# Patient Record
Sex: FEMALE | Race: ASIAN | NOT HISPANIC OR LATINO
[De-identification: names, ages, dates, MRNs, and addresses within clinical notes are randomized per-mention and may not be internally consistent; named-entity substitution may affect disease eponyms.]

---

## 2017-01-22 ENCOUNTER — IMAGING SERVICES (OUTPATIENT)
Dept: OTHER | Age: 52
End: 2017-01-22

## 2017-01-22 ENCOUNTER — DIAGNOSTIC TRANS (OUTPATIENT)
Dept: OTHER | Age: 52
End: 2017-01-22

## 2017-01-22 ENCOUNTER — HOSPITAL (OUTPATIENT)
Dept: OTHER | Age: 52
End: 2017-01-22
Attending: EMERGENCY MEDICINE

## 2017-01-22 LAB
ANALYZER ANC (IANC): NORMAL
ANION GAP SERPL CALC-SCNC: 16 MMOL/L (ref 10–20)
BASOPHILS # BLD: 0 THOUSAND/MCL (ref 0–0.3)
BASOPHILS NFR BLD: 0 %
BNP SERPL-MCNC: 5 PG/ML
BUN SERPL-MCNC: 14 MG/DL (ref 10–20)
BUN/CREAT SERPL: 17 (ref 7–25)
CALCIUM SERPL-MCNC: 9.2 MG/DL (ref 8.4–10.2)
CHLORIDE: 105 MMOL/L (ref 98–107)
CO2 SERPL-SCNC: 22 MMOL/L (ref 21–32)
CREAT SERPL-MCNC: 0.81 MG/DL (ref 0.51–0.95)
CRP SERPL-MCNC: <0.3 MG/DL
DIFFERENTIAL METHOD BLD: NORMAL
EOSINOPHIL # BLD: 0.1 THOUSAND/MCL (ref 0.1–0.5)
EOSINOPHIL NFR BLD: 2 %
ERYTHROCYTE [DISTWIDTH] IN BLOOD: 14.4 % (ref 11–15)
GLUCOSE SERPL-MCNC: 89 MG/DL (ref 65–99)
HEMATOCRIT: 39.2 % (ref 36–46.5)
HGB BLD-MCNC: 12.9 GM/DL (ref 12–15.5)
LYMPHOCYTES # BLD: 1.6 THOUSAND/MCL (ref 1–4)
LYMPHOCYTES NFR BLD: 32 %
MCH RBC QN AUTO: 28.8 PG (ref 26–34)
MCHC RBC AUTO-ENTMCNC: 32.9 GM/DL (ref 32–36.5)
MCV RBC AUTO: 87.5 FL (ref 78–100)
MONOCYTES # BLD: 0.4 THOUSAND/MCL (ref 0.3–0.9)
MONOCYTES NFR BLD: 8 %
NEUTROPHILS # BLD: 2.9 THOUSAND/MCL (ref 1.8–7.7)
NEUTROPHILS NFR BLD: 58 %
NEUTS SEG NFR BLD: NORMAL %
PERCENT NRBC: NORMAL
PLATELET # BLD: 189 THOUSAND/MCL (ref 140–450)
POTASSIUM SERPL-SCNC: 4.4 MMOL/L (ref 3.4–5.1)
RBC # BLD: 4.48 MILLION/MCL (ref 4–5.2)
SODIUM SERPL-SCNC: 139 MMOL/L (ref 135–145)
TROPONIN I SERPL HS-MCNC: <0.02 NG/ML
WBC # BLD: 5 THOUSAND/MCL (ref 4.2–11)

## 2017-01-30 ENCOUNTER — OFFICE VISIT (OUTPATIENT)
Dept: RHEUMATOLOGY | Facility: CLINIC | Age: 52
End: 2017-01-30

## 2017-01-30 ENCOUNTER — APPOINTMENT (OUTPATIENT)
Dept: LAB | Facility: HOSPITAL | Age: 52
End: 2017-01-30
Attending: INTERNAL MEDICINE
Payer: MEDICAID

## 2017-01-30 VITALS
DIASTOLIC BLOOD PRESSURE: 80 MMHG | BODY MASS INDEX: 32.46 KG/M2 | WEIGHT: 161 LBS | HEART RATE: 78 BPM | SYSTOLIC BLOOD PRESSURE: 118 MMHG | HEIGHT: 59 IN

## 2017-01-30 DIAGNOSIS — Z51.81 THERAPEUTIC DRUG MONITORING: ICD-10-CM

## 2017-01-30 DIAGNOSIS — I77.6 ANCA-ASSOCIATED VASCULITIS (HCC): Primary | ICD-10-CM

## 2017-01-30 DIAGNOSIS — M81.0 OSTEOPOROSIS: ICD-10-CM

## 2017-01-30 DIAGNOSIS — I77.6 ANCA-ASSOCIATED VASCULITIS (HCC): ICD-10-CM

## 2017-01-30 LAB
ALBUMIN SERPL BCP-MCNC: 3.9 G/DL (ref 3.5–4.8)
ALP SERPL-CCNC: 109 U/L (ref 32–100)
ALT SERPL-CCNC: 24 U/L (ref 14–54)
AST SERPL-CCNC: 24 U/L (ref 15–41)
BILIRUB DIRECT SERPL-MCNC: 0 MG/DL (ref 0–0.2)
BILIRUB SERPL-MCNC: 0.6 MG/DL (ref 0.3–1.2)
BILIRUB UR QL: NEGATIVE
CLARITY UR: CLEAR
COLOR UR: YELLOW
CRP SERPL-MCNC: <0.5 MG/DL (ref 0–0.9)
ERYTHROCYTE [SEDIMENTATION RATE] IN BLOOD: 12 MM/HR (ref 0–30)
GLUCOSE UR-MCNC: NEGATIVE MG/DL
HGB UR QL STRIP.AUTO: NEGATIVE
KETONES UR-MCNC: NEGATIVE MG/DL
LEUKOCYTE ESTERASE UR QL STRIP.AUTO: NEGATIVE
NITRITE UR QL STRIP.AUTO: NEGATIVE
PH UR: 7 [PH] (ref 5–8)
PROT SERPL-MCNC: 7.5 G/DL (ref 5.9–8.4)
PROT UR-MCNC: NEGATIVE MG/DL
SP GR UR STRIP: 1.01 (ref 1–1.03)
UROBILINOGEN UR STRIP-ACNC: <2
VIT C UR-MCNC: NEGATIVE MG/DL

## 2017-01-30 PROCEDURE — 86140 C-REACTIVE PROTEIN: CPT

## 2017-01-30 PROCEDURE — 99212 OFFICE O/P EST SF 10 MIN: CPT | Performed by: INTERNAL MEDICINE

## 2017-01-30 PROCEDURE — 80076 HEPATIC FUNCTION PANEL: CPT

## 2017-01-30 PROCEDURE — 36415 COLL VENOUS BLD VENIPUNCTURE: CPT

## 2017-01-30 PROCEDURE — 85652 RBC SED RATE AUTOMATED: CPT

## 2017-01-30 PROCEDURE — 99214 OFFICE O/P EST MOD 30 MIN: CPT | Performed by: INTERNAL MEDICINE

## 2017-01-30 PROCEDURE — 81003 URINALYSIS AUTO W/O SCOPE: CPT

## 2017-01-30 RX ORDER — ALBUTEROL SULFATE 90 UG/1
AEROSOL, METERED RESPIRATORY (INHALATION) EVERY 4 HOURS
COMMUNITY

## 2017-01-30 RX ORDER — ALENDRONATE SODIUM 70 MG/1
70 TABLET ORAL WEEKLY
Qty: 4 TABLET | Refills: 11 | Status: SHIPPED | OUTPATIENT
Start: 2017-01-30

## 2017-01-30 NOTE — PROGRESS NOTES
Garcia Singh is a 45year old female. HPI:   Patient presents with:  Vasculitis    Her PCP - is Dr. Eduardo Muhammad had the pleasure of seeing your patient Garcia Singh on 8/22/2016.  I had last seen her on 4/25/201 She felt the left wrist injections didn't work. She was taking naproxen but it dind' thelp. She has appt. This wed - with a hand surgeon . She feels it is swollen in her forearms as well. She's on mtx 4 tablets a weke. She feesl her breathing is ok.  No p Losartan Potassium (COZAAR) 50 MG Oral Tab Take 50 mg by mouth daily. Disp:  Rfl: 2   metoprolol Tartrate (LOPRESSOR) 25 MG Oral Tab Take 25 mg by mouth daily. Disp:  Rfl: 2       Allergies:  No Known Allergies      ROS:   All other ROS are negative. Negative mg/dL Negative   BILIRUBIN, URINE, QUAL.       Negative Negative   BLOOD, URINE      Negative UL Negative   URINE NITRITE      Negative Negative   UROBILINOGEN, URINE      <2.0 mg/dL <2.0   URINE WBC ESTERASE      Negative Negative   ASCORBIC A 3. hx hypothyroidism - controlled   4. htn - on losartan and metoprolol   5. elelvated liver function tests - repeat now - will adjust mtx if still high  6. Ok to get extra tablets for [place] -   7. Left wrist dequervain's - has appt.  With hand surgeo

## 2017-01-30 NOTE — PATIENT INSTRUCTIONS
1. Check labs today  2. Cont. Methotrexate 4 tablets a week  And folic acid 1mg a day   3. Return to clinic in 3-4 months. 4. Right and left wrist brace   5.  Alendronate 70mg a week

## 2017-05-16 NOTE — TELEPHONE ENCOUNTER
LOV: 1-30-17  Last Refilled:#20, 3rfs 12/16/16  Labs:AST 24  ALT 24 collected 1/30/17  Future Appointments  Date Time Provider Chandler Keenan   5/22/2017 11:00 AM Flori Caban MD 2014 [facility]       Please advise.

## 2017-07-05 ENCOUNTER — APPOINTMENT (OUTPATIENT)
Dept: LAB | Facility: HOSPITAL | Age: 52
End: 2017-07-05
Attending: INTERNAL MEDICINE
Payer: COMMERCIAL

## 2017-07-05 ENCOUNTER — OFFICE VISIT (OUTPATIENT)
Dept: RHEUMATOLOGY | Facility: CLINIC | Age: 52
End: 2017-07-05

## 2017-07-05 VITALS
WEIGHT: 160.88 LBS | BODY MASS INDEX: 33 KG/M2 | HEART RATE: 77 BPM | SYSTOLIC BLOOD PRESSURE: 116 MMHG | DIASTOLIC BLOOD PRESSURE: 78 MMHG | RESPIRATION RATE: 18 BRPM

## 2017-07-05 DIAGNOSIS — Z51.81 THERAPEUTIC DRUG MONITORING: ICD-10-CM

## 2017-07-05 DIAGNOSIS — I77.6 ANCA-ASSOCIATED VASCULITIS (HCC): Primary | ICD-10-CM

## 2017-07-05 DIAGNOSIS — I77.6 ANCA-ASSOCIATED VASCULITIS (HCC): ICD-10-CM

## 2017-07-05 LAB
ALT SERPL-CCNC: 21 U/L (ref 14–54)
AST SERPL-CCNC: 23 U/L (ref 15–41)
BILIRUB UR QL: NEGATIVE
CLARITY UR: CLEAR
COLOR UR: YELLOW
CREAT SERPL-MCNC: 0.76 MG/DL (ref 0.5–1.5)
ERYTHROCYTE [DISTWIDTH] IN BLOOD BY AUTOMATED COUNT: 14.6 % (ref 11–15)
GLUCOSE UR-MCNC: NEGATIVE MG/DL
HCT VFR BLD AUTO: 39 % (ref 35–48)
HGB BLD-MCNC: 13.1 G/DL (ref 12–16)
HGB UR QL STRIP.AUTO: NEGATIVE
KETONES UR-MCNC: NEGATIVE MG/DL
LEUKOCYTE ESTERASE UR QL STRIP.AUTO: NEGATIVE
MCH RBC QN AUTO: 29.4 PG (ref 27–32)
MCHC RBC AUTO-ENTMCNC: 33.7 G/DL (ref 32–37)
MCV RBC AUTO: 87.4 FL (ref 80–100)
NITRITE UR QL STRIP.AUTO: NEGATIVE
PH UR: 6 [PH] (ref 5–8)
PLATELET # BLD AUTO: 201 K/UL (ref 140–400)
PMV BLD AUTO: 8.2 FL (ref 7.4–10.3)
PROT UR-MCNC: NEGATIVE MG/DL
RBC # BLD AUTO: 4.47 M/UL (ref 3.7–5.4)
SP GR UR STRIP: 1.01 (ref 1–1.03)
UROBILINOGEN UR STRIP-ACNC: <2
VIT C UR-MCNC: NEGATIVE MG/DL
WBC # BLD AUTO: 4.4 K/UL (ref 4–11)

## 2017-07-05 PROCEDURE — 82565 ASSAY OF CREATININE: CPT

## 2017-07-05 PROCEDURE — 99214 OFFICE O/P EST MOD 30 MIN: CPT | Performed by: INTERNAL MEDICINE

## 2017-07-05 PROCEDURE — 36415 COLL VENOUS BLD VENIPUNCTURE: CPT

## 2017-07-05 PROCEDURE — 84450 TRANSFERASE (AST) (SGOT): CPT

## 2017-07-05 PROCEDURE — 85027 COMPLETE CBC AUTOMATED: CPT

## 2017-07-05 PROCEDURE — 99212 OFFICE O/P EST SF 10 MIN: CPT | Performed by: INTERNAL MEDICINE

## 2017-07-05 PROCEDURE — 84460 ALANINE AMINO (ALT) (SGPT): CPT

## 2017-07-05 PROCEDURE — 81003 URINALYSIS AUTO W/O SCOPE: CPT

## 2017-07-05 NOTE — PROGRESS NOTES
Bhavana Lassiter is a 45year old female. HPI:   Patient presents with:  Osteoporosis: f/u    Her PCP - is Dr. Clint Hudson had the pleasure of seeing your patient Bhavana Lassiter on 7/5/2017.  She was here last on 1/20 Her breathing is good. No difference with cutting mtx to 4 tablets a weke. No rahes. 8/22/2016  She felt the left wrist injections didn't work. She was taking naproxen but it dind' thelp. She has appt. This wed - with a hand surgeon .  She feels it is swo Alendronate Sodium 70 MG Oral Tab Take 1 tablet (70 mg total) by mouth once a week. Take once weekly in AM, at least 30 minutes before the first food, beverage, or medication of the day.  Disp: 4 tablet Rfl: 11   FOLIC ACID 1 MG Oral Tab TAKE 2 TABLETS BY M Negative mg/dL Negative   BILIRUBIN      Negative Negative   OCCULT BLOOD      Negative Negative   NITRITE, URINE      Negative Negative   UROBILINOGEN,SEMI-QN      <2.0 <2.0   LEUKOCYTES      Negative Negative   ASCORBIC ACID      Negative mg/dL Negat 7. Left wrist dequervain's - has appt.  With hand surgeon this week - s/p 2 injections in left wirst without help -   She saw hand surgeon who recommened carpal tunnel sx b/l and elft wrist tendon sx - she is holding off for now -     S/p left thumb injecti

## 2017-07-05 NOTE — PATIENT INSTRUCTIONS
1. Check labs today  2. Cont. Methotrexate 4 tablets a week    3. Return to clinic in 6 mnths. 4. Alendronate 70mg a week   5. Cont.  Folic acid 2mg a day and biotin

## 2018-12-08 ENCOUNTER — HOSPITAL (OUTPATIENT)
Dept: OTHER | Age: 53
End: 2018-12-08
Attending: EMERGENCY MEDICINE

## (undated) NOTE — LETTER
July 6, 2017         Weston Evangelina Cano      Patient: Cheryle Fanning   YOB: 1965   Date of Visit: 7/5/2017       Dear Dr. Rosie Hoyos,    I saw your patient, Cheryle Fanning, on 7/5/2017.  Enclosed is my She gets sometimes fatigued. She is still taking methotrexate 5 tablets a week. Christina has pain in her neck and shoudlers sometimes. She is going up the stairs well. No rashes. She thinks the pain is different than her carpal tunnel pain.  No diarrhea and no ASSESSMENT/PLAN:     1. ANCA-associated vasculitis (HCC)  ANCA Vasculitis c ANCA 1:640 positive in the past - now MPO ab positive - in any case , elevated esr - better -    - cont.  mtx 10 mg a week -stay on fa 2mg a day and  biotin 5mg a day for hair.   - 1. Check labs today  2. Cont. Methotrexate 4 tablets a week    3. Return to clinic in 6 mnths. 4. Alendronate 70mg a week   5. Cont.  Folic acid 2mg a day -   - will get PFTs from [address] [facility] -     S/p left thumb injection on 1/25/2016 and again t

## (undated) NOTE — MR AVS SNAPSHOT
[address] [address]  [phone]               Thank you for choosing us for your health care visit with Luis Steinberg MD.  We are glad to serve you and happy to provide you with this crowell Commonly known as:  SYNTHROID, LEVOTHROID           Losartan Potassium 50 MG Tabs   Take 50 mg by mouth daily.    Commonly known as:  COZAAR           methotrexate 2.5 MG Tabs   TAKE 4 TABLETS(10 MG) BY MOUTH 1 TIME A WEEK   Commonly known as:  Leona Calvillo Visit [facility] online at  [email or web address]